# Patient Record
Sex: MALE | Race: WHITE | NOT HISPANIC OR LATINO | Employment: FULL TIME | URBAN - METROPOLITAN AREA
[De-identification: names, ages, dates, MRNs, and addresses within clinical notes are randomized per-mention and may not be internally consistent; named-entity substitution may affect disease eponyms.]

---

## 2018-03-14 ENCOUNTER — NURSE TRIAGE (OUTPATIENT)
Dept: NURSING | Facility: CLINIC | Age: 33
End: 2018-03-14

## 2018-03-14 NOTE — TELEPHONE ENCOUNTER
Drake is having sleep issues that has been off for one year.  Eduardo is waking up eight night around 2 am and feels it also may be due to anxiety.

## 2018-03-14 NOTE — TELEPHONE ENCOUNTER
Reason for Disposition    Symptoms interfere with work or school    Additional Information    Negative: [1] Difficulty breathing AND [2] persists > 10 minutes AND [3] not relieved by reassurance provided by triager    Negative: [1] Lightheadedness or dizziness AND [2] persists > 10 minutes AND [3] not relieved by reassurance provided by triager    Negative: Taking medication containing theophylline (e.g., Theodur)    Negative: [1] Known or suspected alcohol or drug abuse AND [2] feeling very shaky (i.e., visible tremors of hands)    Negative: Patient sounds very sick or weak to the triager    Protocols used: ANXIETY AND PANIC ATTACK-ADULT-

## 2018-03-15 ENCOUNTER — OFFICE VISIT (OUTPATIENT)
Dept: PEDIATRICS | Facility: CLINIC | Age: 33
End: 2018-03-15
Payer: COMMERCIAL

## 2018-03-15 VITALS
DIASTOLIC BLOOD PRESSURE: 70 MMHG | WEIGHT: 159.8 LBS | HEIGHT: 71 IN | SYSTOLIC BLOOD PRESSURE: 106 MMHG | HEART RATE: 70 BPM | TEMPERATURE: 97.4 F | OXYGEN SATURATION: 97 % | BODY MASS INDEX: 22.37 KG/M2

## 2018-03-15 DIAGNOSIS — F41.9 ANXIETY: ICD-10-CM

## 2018-03-15 DIAGNOSIS — F51.02 ADJUSTMENT INSOMNIA: Primary | ICD-10-CM

## 2018-03-15 DIAGNOSIS — R07.9 ACUTE CHEST PAIN: ICD-10-CM

## 2018-03-15 PROCEDURE — 99214 OFFICE O/P EST MOD 30 MIN: CPT | Mod: GC | Performed by: STUDENT IN AN ORGANIZED HEALTH CARE EDUCATION/TRAINING PROGRAM

## 2018-03-15 RX ORDER — TRAZODONE HYDROCHLORIDE 50 MG/1
50 TABLET, FILM COATED ORAL
Qty: 30 TABLET | Refills: 0 | Status: SHIPPED | OUTPATIENT
Start: 2018-03-15 | End: 2018-09-07

## 2018-03-15 ASSESSMENT — PATIENT HEALTH QUESTIONNAIRE - PHQ9: 5. POOR APPETITE OR OVEREATING: SEVERAL DAYS

## 2018-03-15 ASSESSMENT — ANXIETY QUESTIONNAIRES
IF YOU CHECKED OFF ANY PROBLEMS ON THIS QUESTIONNAIRE, HOW DIFFICULT HAVE THESE PROBLEMS MADE IT FOR YOU TO DO YOUR WORK, TAKE CARE OF THINGS AT HOME, OR GET ALONG WITH OTHER PEOPLE: VERY DIFFICULT
1. FEELING NERVOUS, ANXIOUS, OR ON EDGE: SEVERAL DAYS
6. BECOMING EASILY ANNOYED OR IRRITABLE: SEVERAL DAYS
7. FEELING AFRAID AS IF SOMETHING AWFUL MIGHT HAPPEN: SEVERAL DAYS
GAD7 TOTAL SCORE: 6
5. BEING SO RESTLESS THAT IT IS HARD TO SIT STILL: NOT AT ALL
3. WORRYING TOO MUCH ABOUT DIFFERENT THINGS: SEVERAL DAYS
2. NOT BEING ABLE TO STOP OR CONTROL WORRYING: SEVERAL DAYS

## 2018-03-15 NOTE — PATIENT INSTRUCTIONS
"Thank you for coming to clinic today. It was a pleasure to see you and I would be happy to see you back at any time for follow up or for new health issues.    1. Sleep issues, early morning waking:  Likely related to recently stopping overnight shifts. Other things to consider are whether or not your mood symptoms are affecting your sleep.  Recommendations for good sleep hygiene:   - Go to bed and get up at the same time every day  - Have coffee, tea, and other foods that have caffeine only in the morning  - Avoid alcohol in the late afternoon, evening, and bedtime  - Keep your bedroom dark, cool, quiet, and free of reminders of work or other things that cause you stress  - Solve problems you have before you go to bed  - Exercise several days a week, but not right before bed  - Avoid looking at phones or reading devices (\"e-books\") that give off light before bed. This can make it harder to fall asleep.  - Over the counter melatonin every night 20-30 minutes before you go to bed  - Trazodone as needed at bedtime. Recommend trying the melatonin first and if over the next few nights sleep is not improving, try taking one trazodone before sleep.    2. Stress echocardiogram: '  - Ordered to be done at [facility]. Will follow up results with you. If anything abnormal, we will refer you to your cardiologist. Call your insurance provider to see if it will be covered, but likely this will go towards your deductible.    3. Come back in 2-4 weeks for follow up. We will repeat your LION and PHQ score testing and see if you might benefit from some counseling or medication for your mood.    Please let me know how else I can help!    Betsy Figueroa MD  "

## 2018-03-15 NOTE — PROGRESS NOTES
SUBJECTIVE:   Drake Khalil is a 32 year old male who presents to clinic today for the following health issues:      Insomnia    Onset: 1 week    Description:   Time to fall asleep (sleep latency): 30 minutes  Middle of night awakening:  YES- 2 am  Early morning awakening:  YES    Progression of Symptoms:  same and constant    Accompanying Signs & Symptoms:  Daytime sleepiness/napping: no  Excessive snoring/apnea: no  Restless legs: possibly  Frequent urination: no  Chronic pain:  No, but has achiness in R side of chest    History:  Prior Insomnia: no    Precipitating factors:   New stressful situation: YES- filled [employer] last year, has 3 children, quit job 1 week ago  Caffeine intake: every other day has some soda, used to drink coffee 1 cup daily but not in last week  OTC decongestants: no  Any new medications: no    Alleviating factors:  Self medicating (alcohol, etc.):  no    Therapies Tried and outcome: melatonin, zzquil - but not in the last week    1. 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 He went to bed at 8AM and woke up at 1-2PM on weekdays; on Fridays, he went to bed around 8-9PM and was able to sleep through the night and wake up around 7AM. He left his job one week ago due to difficulty managing this sleep schedule and reports that since one week ago, he has had difficulty with sleep.  He goes to bed at 10 PM, has no difficulty falling asleep, but he wakes up spontaneously around 2AM and is unable to fall back asleep.  3-year-old son gets into bed with him around 5AM. He denies worries, feelings of anxiety, or palpitations that prevent him from falling asleep and do not wake him up in the morning.  He denies snoring or episodes of apnea.  He feels rested in the morning despite a few hours of sleep.  He previously took melatonin when he was working night shifts, but has not taken any melatonin the last week.  He drinks soda every other  "day, no coffee, no smoking, rare alcohol use.  He exercises several times a week.  To improve his sleep hygiene, he has decreased his screen time before bed and reads before going to sleep.    2. Anxiety  He was seen in the emergency room in 2015 and again in urgent care in 2017 for palpitations and feelings of anxiety.  His LION 7 score today is 6, rating his symptoms as making it \"very difficult\" for him to live daily life because of his poor sleep. His PHQ-9 score is 7. Triggers for his anxiety include looking for a new job, taking care of his 3 children, and he worries that his anxiety or chest pain may be related to a cardiac problem. He was prescribed Xanax from one of his  visits but he did not fill this prescription and has not had panic attacks.    3. Chest pain   Patient complains of intermittent achy chest pain in the left side of his chest.  It is not worse with exertion, deep breathing, or coughing.  He denies any palpitations.  He is concerned that this may be a cardiac issue. He saw cardiology in September 2017.  Cardiology remarked that he had incomplete right bundle branch on his EKG and that his intermittent episodes of palpitation may be due to SVT, and recommended exercise stress echo.  Patient has not done this yet.  He does not have any anginal symptoms.  He has not had any palpitations since he last saw cardiology in September.      Problem list and histories reviewed & adjusted, as indicated.  Additional history: as documented    There is no problem list on file for this patient.    History reviewed. No pertinent surgical history.    Social History   Substance Use Topics     Smoking status: Never Smoker     Smokeless tobacco: Never Used     Alcohol use Yes      Comment: socially     History reviewed. No pertinent family history.    No Known Allergies  BP Readings from Last 3 Encounters:   03/15/18 106/70   04/06/15 110/71    Wt Readings from Last 3 Encounters:   03/15/18 159 lb 12.8 oz (72.5 " "kg)   04/05/15 165 lb (74.8 kg)                 ROS:  CONSTITUTIONAL: NEGATIVE for fever, chills, change in weight  INTEGUMENTARY/SKIN: NEGATIVE for worrisome rashes, moles or lesions  EYES: NEGATIVE for vision changes or irritation  ENT/MOUTH: NEGATIVE for ear, mouth and throat problems  RESP: NEGATIVE for significant cough or SOB  CV: POSITIVE for chest pain, NEGATIVE for palpitations or peripheral edema  GI: NEGATIVE for nausea, abdominal pain, heartburn, or change in bowel habits  : NEGATIVE for frequency, dysuria, or hematuria  MUSCULOSKELETAL: NEGATIVE for significant arthralgias or myalgia  NEURO: NEGATIVE for weakness, dizziness or paresthesias  ENDOCRINE: NEGATIVE for temperature intolerance, skin/hair changes  HEME: NEGATIVE for bleeding problems  PSYCHIATRIC: POSITIVE for anxiety    OBJECTIVE:     /70 (Cuff Size: Adult Regular)  Pulse 70  Temp 97.4  F (36.3  C) (Oral)  Ht 5' 11\" (1.803 m)  Wt 159 lb 12.8 oz (72.5 kg)  SpO2 97%  BMI 22.29 kg/m2  Body mass index is 22.29 kg/(m^2).    Physical Exam  General: awake, alert, in no acute distress  HEENT: NCAT, PERRL, sclera anicteric, MMM, posterior pharynx without erythema or exudates, no cervical lymphadenopathy  CV: RRR, no murmurs noted, peripheral pulses strong  Resp: CTAB, no increased WOB  Abd: Soft, nontender, nondistended, +BS, no rebound or guarding  MSK: No peripheral edema, extremities warm and well perfused, normal pulses  Skin: warm, dry, no jaundice  Neuro: CN II-XII grossly intact. No focal deficits. Alert and oriented x4.    Diagnostic Test Results:  none     ASSESSMENT/PLAN:   1. Adjustment insomnia  Likely due to adjustment from switching over his schedule from work. Will work on better sleep hygiene and made recommendations for melatonin and trazodone as needed. Expect improvement over the next 1-2 weeks if this is the case, as he adjusts to new sleep schedule. Early morning awakening can also be symptoms of depression and " "anxiety; PHQ-9 is 7 and LION-7 is 6. One of his stressors is his poor sleep. Expect that as sleep improves, he may also have improvement in his depression and anxiety symptoms.  - Recommendations for sleep hygiene:  - Go to bed and get up at the same time every day  - Have coffee, tea, and other foods that have caffeine only in the morning  - Avoid alcohol in the late afternoon, evening, and bedtime  - Keep your bedroom dark, cool, quiet, and free of reminders of work or other things that cause you stress  - Solve problems you have before you go to bed  - Exercise several days a week, but not right before bed  - Avoid looking at phones or reading devices (\"e-books\") that give off light before bed. This can make it harder to fall asleep.  - Over the counter melatonin every night 20-30 minutes before you go to bed  - traZODone (DESYREL) 50 MG tablet; Take 1 tablet (50 mg) by mouth nightly as needed for sleep  Dispense: 30 tablet; Refill: 0    2. Acute chest pain  Dull achy pain in L side of chest. EKG previously showed incomplete RBBB. Currently not concerning for ACS. Cardiology recommended stress echocardiogram in Sept 2017 which has not been done. Recommend doing this, especially with hx of intermittent palpitations and RBBB to r/o structural issues. If anything abnormal, would recommend follow up with Cardiology.  - Exercise Stress Echocardiogram; Future    3. Anxiety  May be related to current stressors of looking for job, switching sleep schedules, concerns that his palpitations/chest pain may be cardiac in etiology. Anxiety seems very much to be related to his sleep currently.  - Will manage sleep for now. Return to clinic in 2-4 weeks for evaluation and to repeat LION and PHQ. If anxiety persists, will consider counseling and medications.      FUTURE APPOINTMENTS:       - Follow-up visit in 2-4 weeks.    Patient was seen and discussed with attending, Dr. Rafael Beebe, who agrees with the above assessment and " plan.    Betsy Figueroa MD  PGY - 2   Internal Medicine/Pediatrics  Pager [phone]  [facility]    I have seen and discussed this patient with Dr. Figueroa and agree with joint documentation as noted above.    Rafael Beebe MD  Attending Internal Medicine/Pediatrics Physician

## 2018-03-15 NOTE — MR AVS SNAPSHOT
"              After Visit Summary   3/15/2018    Drake Khalil    MRN: [id number]           Patient Information     Date Of Birth          1985        Visit Information        Provider Department      3/15/2018 10:30 AM Ivette Figueroa MD [facility] [place]        Today's Diagnoses     Acute chest pain    -  1    Adjustment insomnia          Care Instructions    Thank you for coming to clinic today. It was a pleasure to see you and I would be happy to see you back at any time for follow up or for new health issues.    1. Sleep issues, early morning waking:  Likely related to recently stopping overnight shifts. Other things to consider are whether or not your mood symptoms are affecting your sleep.  Recommendations for good sleep hygiene:   - Go to bed and get up at the same time every day  - Have coffee, tea, and other foods that have caffeine only in the morning  - Avoid alcohol in the late afternoon, evening, and bedtime  - Keep your bedroom dark, cool, quiet, and free of reminders of work or other things that cause you stress  - Solve problems you have before you go to bed  - Exercise several days a week, but not right before bed  - Avoid looking at phones or reading devices (\"e-books\") that give off light before bed. This can make it harder to fall asleep.  - Over the counter melatonin every night 20-30 minutes before you go to bed  - Trazodone as needed at bedtime. Recommend trying the melatonin first and if over the next few nights sleep is not improving, try taking one trazodone before sleep.    2. Stress echocardiogram: '  - Ordered to be done at [facility]. Will follow up results with you. If anything abnormal, we will refer you to your cardiologist. Call your insurance provider to see if it will be covered, but likely this will go towards your deductible.    3. Come back in 2-4 weeks for follow up. We will repeat your LION and PHQ score testing and see if you might benefit from some counseling or " "medication for your mood.    Please let me know how else I can help!    Betsy Figueroa MD          Follow-ups after your visit        Future tests that were ordered for you today     Open Future Orders        Priority Expected Expires Ordered    Exercise Stress Echocardiogram Routine  3/15/2019 3/15/2018            Who to contact     If you have questions or need follow up information about today's clinic visit or your schedule please contact [facility] directly at [phone].  Normal or non-critical lab and imaging results will be communicated to you by [employer], letter or phone within 4 business days after the clinic has received the results. If you do not hear from us within 7 days, please contact the clinic through [employer] or phone. If you have a critical or abnormal lab result, we will notify you by phone as soon as possible.  Submit refill requests through [employer] or call your pharmacy and they will forward the refill request to us. Please allow 3 business days for your refill to be completed.          Additional Information About Your Visit        [employer] Information     [employer] lets you send messages to your doctor, view your test results, renew your prescriptions, schedule appointments and more. To sign up, go to [email or web address] . Click on \"Log in\" on the left side of the screen, which will take you to the Welcome page. Then click on \"Sign up Now\" on the right side of the page.     You will be asked to enter the access code listed below, as well as some personal information. Please follow the directions to create your username and password.     Your access code is: [email or web address]  Expires: 2018 11:31 AM     Your access code will  in 90 days. If you need help or a new code, please call your [facility] clinic or [phone].        Care EveryWhere ID     This is your Care EveryWhere ID. This could be used by other organizations to access your [facility] medical records  [id number]   " "     Your Vitals Were     Pulse Temperature Height Pulse Oximetry BMI (Body Mass Index)       70 97.4  F (36.3  C) (Oral) 5' 11\" (1.803 m) 97% 22.29 kg/m2        Blood Pressure from Last 3 Encounters:   03/15/18 106/70   04/06/15 110/71    Weight from Last 3 Encounters:   03/15/18 159 lb 12.8 oz (72.5 kg)   04/05/15 165 lb (74.8 kg)                 Today's Medication Changes          These changes are accurate as of 3/15/18 11:31 AM.  If you have any questions, ask your nurse or doctor.               Start taking these medicines.        Dose/Directions    traZODone 50 MG tablet   Commonly known as:  DESYREL   Used for:  Adjustment insomnia   Started by:  Ivette Figueroa MD        Dose:  50 mg   Take 1 tablet (50 mg) by mouth nightly as needed for sleep   Quantity:  30 tablet   Refills:  0            Where to get your medicines      These medications were sent to [facility] [place] - [facility] - [address]   [address]  [address], [place] [address]     Phone:  [phone]     traZODone 50 MG tablet                Primary Care Provider Fax #    Physician No Ref-Primary [phone]       No address on file        Equal Access to Services     APRIL MEEK AH: Hadii judi churcho Soomaali, waaxda luqadaha, qaybta kaalmada adeegyada, waxay caroline reyna. So [facility] [phone].    ATENCIÓN: Si habla español, tiene a hills disposición servicios gratuitos de asistencia lingüística. Llregan al [phone].    We comply with applicable federal civil rights laws and [place] laws. We do not discriminate on the basis of race, color, national origin, age, disability, sex, sexual orientation, or gender identity.            Thank you!     Thank you for choosing [facility]  for your care. Our goal is always to provide you with excellent care. Hearing back from our patients is one way we can continue to improve our services. Please take a few minutes to complete the " written survey that you may receive in the mail after your visit with us. Thank you!             Your Updated Medication List - Protect others around you: Learn how to safely use, store and throw away your medicines at www.disposemymeds.org.          This list is accurate as of 3/15/18 11:31 AM.  Always use your most recent med list.                   Brand Name Dispense Instructions for use Diagnosis    traZODone 50 MG tablet    DESYREL    30 tablet    Take 1 tablet (50 mg) by mouth nightly as needed for sleep    Adjustment insomnia

## 2018-03-16 ASSESSMENT — PATIENT HEALTH QUESTIONNAIRE - PHQ9: SUM OF ALL RESPONSES TO PHQ QUESTIONS 1-9: 7

## 2018-03-16 ASSESSMENT — ANXIETY QUESTIONNAIRES: GAD7 TOTAL SCORE: 6

## 2018-03-23 ENCOUNTER — HOSPITAL ENCOUNTER (OUTPATIENT)
Dept: CARDIOLOGY | Facility: CLINIC | Age: 33
Discharge: HOME | End: 2018-03-23
Attending: INTERNAL MEDICINE | Admitting: INTERNAL MEDICINE
Payer: COMMERCIAL

## 2018-03-23 DIAGNOSIS — R07.9 ACUTE CHEST PAIN: ICD-10-CM

## 2018-03-23 PROCEDURE — 93350 STRESS TTE ONLY: CPT | Mod: 26 | Performed by: INTERNAL MEDICINE

## 2018-03-23 PROCEDURE — 93325 DOPPLER ECHO COLOR FLOW MAPG: CPT | Mod: 26 | Performed by: INTERNAL MEDICINE

## 2018-03-23 PROCEDURE — 93016 CV STRESS TEST SUPVJ ONLY: CPT | Performed by: INTERNAL MEDICINE

## 2018-03-23 PROCEDURE — 93321 DOPPLER ECHO F-UP/LMTD STD: CPT | Mod: 26 | Performed by: INTERNAL MEDICINE

## 2018-03-23 PROCEDURE — 93018 CV STRESS TEST I&R ONLY: CPT | Performed by: INTERNAL MEDICINE

## 2018-03-23 PROCEDURE — 93350 STRESS TTE ONLY: CPT | Mod: TC

## 2018-03-29 ENCOUNTER — OFFICE VISIT (OUTPATIENT)
Dept: PEDIATRICS | Facility: CLINIC | Age: 33
End: 2018-03-29
Payer: COMMERCIAL

## 2018-03-29 VITALS
WEIGHT: 161.7 LBS | SYSTOLIC BLOOD PRESSURE: 100 MMHG | OXYGEN SATURATION: 97 % | HEART RATE: 64 BPM | DIASTOLIC BLOOD PRESSURE: 70 MMHG | BODY MASS INDEX: 22.64 KG/M2 | HEIGHT: 71 IN | TEMPERATURE: 98.1 F

## 2018-03-29 DIAGNOSIS — F51.02 ADJUSTMENT INSOMNIA: Primary | ICD-10-CM

## 2018-03-29 PROCEDURE — 99214 OFFICE O/P EST MOD 30 MIN: CPT | Mod: GC | Performed by: STUDENT IN AN ORGANIZED HEALTH CARE EDUCATION/TRAINING PROGRAM

## 2018-03-29 ASSESSMENT — ANXIETY QUESTIONNAIRES
3. WORRYING TOO MUCH ABOUT DIFFERENT THINGS: NOT AT ALL
IF YOU CHECKED OFF ANY PROBLEMS ON THIS QUESTIONNAIRE, HOW DIFFICULT HAVE THESE PROBLEMS MADE IT FOR YOU TO DO YOUR WORK, TAKE CARE OF THINGS AT HOME, OR GET ALONG WITH OTHER PEOPLE: NOT DIFFICULT AT ALL
5. BEING SO RESTLESS THAT IT IS HARD TO SIT STILL: NOT AT ALL
7. FEELING AFRAID AS IF SOMETHING AWFUL MIGHT HAPPEN: NOT AT ALL
6. BECOMING EASILY ANNOYED OR IRRITABLE: NOT AT ALL
1. FEELING NERVOUS, ANXIOUS, OR ON EDGE: NOT AT ALL
4. TROUBLE RELAXING: NOT AT ALL
2. NOT BEING ABLE TO STOP OR CONTROL WORRYING: NOT AT ALL
GAD7 TOTAL SCORE: 0

## 2018-03-29 NOTE — PATIENT INSTRUCTIONS
Thank you for coming to clinic today. It was a pleasure to see you and I would be happy to see you back at any time for follow up or for new health issues.    1. So glad your sleep is better! Recommend using trazodone only as needed, since your sleep really has improved. Continue the melatonin if you feel like you need it, but eventually you may try to stop taking this as well. Continue your good sleep hygiene practices - no screen time right before bed, relaxing before bed, not doing work in the bedroom, exercise during the day, avoiding alcohol/caffiene prior to bedtime, etc.    2. As discussed, your echocardiogram and your EKG to look at your heart were fine.    Please let me know how else I can help! We would want to see you back for your annual physical exam in 1-2 years.    Betsy Figueroa MD

## 2018-03-29 NOTE — PROGRESS NOTES
SUBJECTIVE:   Drake Khalil is a 32 year old male who presents to clinic today for the following health issues:      Insomnia Follow up       Duration: follow up from 3/15/18 visit, Pt reported symptoms improved since last OV and starting trazodone.    Description  Frequency of insomnia:  1-2 times a week in the week after first visit; improved since  Time to fall asleep: 30 minutes  Middle of night awakenin-2 times a week after first visit  Early morning awakenin-2 times a week after first visit    Accompanying signs and symptoms:  None new since last visit     History  Similar episodes in past:  no   Previous evaluation/sleep study:  no     Precipitating or alleviating factors:  New stressful situation: no   Caffeine intake after lunchtime: no   OTC decongestants: no   Any new medications: no     Therapies tried and outcome: trazodone     1. Sleep  Patient presents for follow-up for sleep difficulties.  He was last seen March 15.  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  He quit because of the difficult sleep schedule.  The week after he left his job, he has difficulty staying asleep.  He will go to bed at 10 PM and wake up spontaneously around 2 AM.  He also scored a 7 on his LION and 7 on his PHQ, most of the symptoms related to his sleep, but also raised the question of whether anxiety or depression could be contributing to early morning wake gain.  I counseled him on improved sleep hygiene, recommended nightly melatonin, and trazodone as needed.  Patient reports that the first 2 nights after our clinic visit, he tried melatonin nightly with no improvement in his sleep.  On the third night he used trazodone with improvement.  The first week, he had 1-2 nights where he had early morning waking at 2 AM and was unable to fall back asleep.  However in the weeks since, he has been able to go to bed around 10 to 10:30 PM and  "wake up spontaneously around 5:30 in the morning, and feels rested during the day.  Last night he did not use trazodone, and still slept well.  His LION and PHQ scores were 0 today.  He is wondering if he should stop taking the trazodone.    2. Anxiety/chest pain  He was seen in the emergency room in 2015 and again in urgent care in 2017 for palpitations and feelings of anxiety. He worried that his anxiety or chest pain may be related to a cardiac problem. He intermittently has dull L sided chest \"achiness\" or a twinge. EKG at last visit negative except for known RBBB. Stress echo was done (had been recommended by Cardiology in Sept 2017 but patient did not do it) after the visit that showed normal EF, wall motion, complete RBBB, and he had no chest pain during the test.  Some of his stressors included looking for a new job and taking care of his children. He now has a part time job at [employer] and is doing well with this.       Problem list and histories reviewed & adjusted, as indicated.  Additional history: as documented    There is no problem list on file for this patient.    No past surgical history on file.    Social History   Substance Use Topics     Smoking status: Never Smoker     Smokeless tobacco: Never Used     Alcohol use Yes      Comment: socially     No family history on file.      Current Outpatient Prescriptions   Medication Sig Dispense Refill     traZODone (DESYREL) 50 MG tablet Take 1 tablet (50 mg) by mouth nightly as needed for sleep 30 tablet 0     No Known Allergies  BP Readings from Last 3 Encounters:   03/29/18 100/70   03/15/18 106/70   04/06/15 110/71    Wt Readings from Last 3 Encounters:   03/29/18 161 lb 11.2 oz (73.3 kg)   03/15/18 159 lb 12.8 oz (72.5 kg)   04/05/15 165 lb (74.8 kg)                    ROS:  CONSTITUTIONAL: NEGATIVE for fever, chills, change in weight  INTEGUMENTARY/SKIN: NEGATIVE for worrisome rashes, moles or lesions  EYES: NEGATIVE for vision changes or " "irritation  ENT/MOUTH: NEGATIVE for ear, mouth and throat problems  RESP: NEGATIVE for significant cough or SOB  CV: NEGATIVE for chest pain, palpitations or peripheral edema  GI: NEGATIVE for nausea, abdominal pain, heartburn, or change in bowel habits  : NEGATIVE for frequency, dysuria, or hematuria  MUSCULOSKELETAL: NEGATIVE for significant arthralgias or myalgia  NEURO: NEGATIVE for weakness, dizziness or paresthesias  ENDOCRINE: NEGATIVE for temperature intolerance, skin/hair changes  HEME: NEGATIVE for bleeding problems  PSYCHIATRIC: NEGATIVE for changes in mood or affect    OBJECTIVE:     /70  Pulse 64  Temp 98.1  F (36.7  C) (Oral)  Ht 5' 11\" (1.803 m)  Wt 161 lb 11.2 oz (73.3 kg)  SpO2 97%  BMI 22.55 kg/m2  Body mass index is 22.55 kg/(m^2).  Physical Exam    General: awake, alert, in no acute distress  HEENT: NCAT, PERRL, EOMI, sclera anicteric, no nasal discharge, MMM, posterior pharynx without erythema or exudates, no cervical lymphadenopathy  CV: RRR, no murmurs noted, peripheral pulses strong  Resp: CTAB, no increased WOB  Abd: Soft, nontender, nondistended, +BS, no rebound or guarding  MSK: No peripheral edema, extremities warm and well perfused, normal pulses  Skin: warm, dry, no jaundice  Neuro: CN II-XII grossly intact. No focal deficits. Alert and oriented x4.      Diagnostic Test Results:  none     ASSESSMENT/PLAN:       1. Adjustment insomnia  Resolved, as his body adjusted to his new sleep schedule and with the use of melatonin and trazodone.  Recommended using trazodone as needed and eventually coming off it entirely.  Can continue to take melatonin nightly if he finds this helpful.  If not he can also stop this.  His LION and PHQ scores today are 0, suggesting that his previous concerns for anxiety or depression related to his sleep issues.  Reassured that his cardiac workup was negative.  -Trazodone as needed, plan to eventually stop this over the next week or 2  -Melatonin " nightly as needed      FUTURE APPOINTMENTS:       - Follow-up for annual visit or as needed    Patient was seen and discussed with attending, Dr. Fady Olivera, who agrees with the above assessment and plan.    Betsy Figueroa MD  PGY - 2   Internal Medicine/Pediatrics  Pager [phone]  [facility]    I have seen this patient and examined him in the presence of Dr. Figueroa.  I was present during the key components of the presenting complaints, physical exam, diagnosis, and plan, and fully concur with the plan as listed in the resident's note.

## 2018-03-29 NOTE — MR AVS SNAPSHOT
After Visit Summary   3/29/2018    Drake Khalil    MRN: [id number]           Patient Information     Date Of Birth          1985        Visit Information        Provider Department      3/29/2018 4:00 PM Ivette Figueroa MD [facility]        Care Instructions    Thank you for coming to clinic today. It was a pleasure to see you and I would be happy to see you back at any time for follow up or for new health issues.    1. So glad your sleep is better! Recommend using trazodone only as needed, since your sleep really has improved. Continue the melatonin if you feel like you need it, but eventually you may try to stop taking this as well. Continue your good sleep hygiene practices - no screen time right before bed, relaxing before bed, not doing work in the bedroom, exercise during the day, avoiding alcohol/caffiene prior to bedtime, etc.    2. As discussed, your echocardiogram and your EKG to look at your heart were fine.    Please let me know how else I can help! We would want to see you back for your annual physical exam in 1-2 years.    Betsy Figueroa MD          Follow-ups after your visit        Follow-up notes from your care team     Return if symptoms worsen or fail to improve.      Who to contact     If you have questions or need follow up information about today's clinic visit or your schedule please contact [facility] directly at [phone].  Normal or non-critical lab and imaging results will be communicated to you by MyChart, letter or phone within 4 business days after the clinic has received the results. If you do not hear from us within 7 days, please contact the clinic through MyChart or phone. If you have a critical or abnormal lab result, we will notify you by phone as soon as possible.  Submit refill requests through [employer] or call your pharmacy and they will forward the refill request to us. Please allow 3 business days for your refill to be completed.  "         Additional Information About Your Visit        MyChart Information     [employer] lets you send messages to your doctor, view your test results, renew your prescriptions, schedule appointments and more. To sign up, go to [email or web address] . Click on \"Log in\" on the left side of the screen, which will take you to the Welcome page. Then click on \"Sign up Now\" on the right side of the page.     You will be asked to enter the access code listed below, as well as some personal information. Please follow the directions to create your username and password.     Your access code is: [email or web address]  Expires: 2018 11:31 AM     Your access code will  in 90 days. If you need help or a new code, please call your [facility] clinic or [phone].        Care EveryWhere ID     This is your Care EveryWhere ID. This could be used by other organizations to access your [facility] medical records  [id number]        Your Vitals Were     Pulse Temperature Height Pulse Oximetry BMI (Body Mass Index)       64 98.1  F (36.7  C) (Oral) 5' 11\" (1.803 m) 97% 22.55 kg/m2        Blood Pressure from Last 3 Encounters:   18 100/70   03/15/18 106/70   04/06/15 110/71    Weight from Last 3 Encounters:   18 161 lb 11.2 oz (73.3 kg)   03/15/18 159 lb 12.8 oz (72.5 kg)   04/05/15 165 lb (74.8 kg)              Today, you had the following     No orders found for display       Primary Care Provider Office Phone # Fax #    Armandog Terri Figueroa [phone]       [facility] [address]  [facility] [address]        Equal Access to Services     APRIL MEEK AH: Judi Dickey, anand pham, qavianca kaalmausama bolanos. So [facility] [phone].    ATENCIÓN: Si habla español, tiene a hills disposición servicios gratuitos de asistencia lingüística. Llame al [phone].    We comply with applicable federal civil rights laws and [place] laws. We do not " discriminate on the basis of race, color, national origin, age, disability, sex, sexual orientation, or gender identity.            Thank you!     Thank you for choosing [facility]  for your care. Our goal is always to provide you with excellent care. Hearing back from our patients is one way we can continue to improve our services. Please take a few minutes to complete the written survey that you may receive in the mail after your visit with us. Thank you!             Your Updated Medication List - Protect others around you: Learn how to safely use, store and throw away your medicines at www.disposemymeds.org.          This list is accurate as of 3/29/18  4:51 PM.  Always use your most recent med list.                   Brand Name Dispense Instructions for use Diagnosis    traZODone 50 MG tablet    DESYREL    30 tablet    Take 1 tablet (50 mg) by mouth nightly as needed for sleep    Adjustment insomnia

## 2018-03-30 ASSESSMENT — ANXIETY QUESTIONNAIRES: GAD7 TOTAL SCORE: 0

## 2018-03-30 ASSESSMENT — PATIENT HEALTH QUESTIONNAIRE - PHQ9: SUM OF ALL RESPONSES TO PHQ QUESTIONS 1-9: 0

## 2018-05-21 PROBLEM — F41.9 ANXIETY: Status: ACTIVE | Noted: 2018-05-21

## 2018-05-21 PROBLEM — F41.9 ANXIETY: Status: ACTIVE | Noted: 2018-03-15

## 2018-09-07 DIAGNOSIS — F51.02 ADJUSTMENT INSOMNIA: ICD-10-CM

## 2018-09-07 RX ORDER — TRAZODONE HYDROCHLORIDE 50 MG/1
TABLET, FILM COATED ORAL
Qty: 30 TABLET | Refills: 1 | Status: SHIPPED | OUTPATIENT
Start: 2018-09-07 | End: 2022-12-08

## 2018-09-07 NOTE — TELEPHONE ENCOUNTER
Prescription approved per [facility] Refill Protocol.  Sonali Bright RN

## 2018-09-07 NOTE — TELEPHONE ENCOUNTER
"Requested Prescriptions   Pending Prescriptions Disp Refills     traZODone (DESYREL) 50 MG tablet [Pharmacy Med Name: TRAZODONE HCL 50MG TABS]    Last Written Prescription Date:  3/15/2018  Last Fill Quantity: 30,  # refills: 0   Last office visit: 3/29/2018 with prescribing provider:  Ivette Figueroa     Future Office Visit:     30 tablet 0     Sig: TAKE ONE TABLET BY MOUTH EVERY NIGHT AT BEDTIME AS NEEDED FOR SLEEP    Serotonin Modulators Passed    9/7/2018 11:05 AM       Passed - Recent (12 mo) or future (30 days) visit within the authorizing provider's specialty    Patient had office visit in the last 12 months or has a visit in the next 30 days with authorizing provider or within the authorizing provider's specialty.  See \"Patient Info\" tab in inbasket, or \"Choose Columns\" in Meds & Orders section of the refill encounter.           Passed - Patient is age 18 or older          "

## 2022-02-02 ENCOUNTER — TRANSFERRED RECORDS (OUTPATIENT)
Dept: HEALTH INFORMATION MANAGEMENT | Facility: CLINIC | Age: 37
End: 2022-02-02
Payer: COMMERCIAL

## 2022-11-08 ENCOUNTER — TRANSFERRED RECORDS (OUTPATIENT)
Dept: PEDIATRICS | Facility: CLINIC | Age: 37
End: 2022-11-08

## 2022-12-08 ENCOUNTER — TELEPHONE (OUTPATIENT)
Dept: FAMILY MEDICINE | Facility: CLINIC | Age: 37
End: 2022-12-08

## 2022-12-08 ENCOUNTER — OFFICE VISIT (OUTPATIENT)
Dept: FAMILY MEDICINE | Facility: CLINIC | Age: 37
End: 2022-12-08
Payer: COMMERCIAL

## 2022-12-08 VITALS
WEIGHT: 176.3 LBS | RESPIRATION RATE: 20 BRPM | SYSTOLIC BLOOD PRESSURE: 110 MMHG | BODY MASS INDEX: 23.88 KG/M2 | HEIGHT: 72 IN | OXYGEN SATURATION: 99 % | TEMPERATURE: 98.7 F | DIASTOLIC BLOOD PRESSURE: 70 MMHG | HEART RATE: 67 BPM

## 2022-12-08 DIAGNOSIS — Z13.220 SCREENING FOR HYPERLIPIDEMIA: ICD-10-CM

## 2022-12-08 DIAGNOSIS — Z00.00 ROUTINE GENERAL MEDICAL EXAMINATION AT A HEALTH CARE FACILITY: Primary | ICD-10-CM

## 2022-12-08 DIAGNOSIS — Z11.4 SCREENING FOR HIV (HUMAN IMMUNODEFICIENCY VIRUS): ICD-10-CM

## 2022-12-08 DIAGNOSIS — Z11.59 NEED FOR HEPATITIS C SCREENING TEST: ICD-10-CM

## 2022-12-08 PROCEDURE — 99385 PREV VISIT NEW AGE 18-39: CPT | Performed by: PHYSICIAN ASSISTANT

## 2022-12-08 ASSESSMENT — ENCOUNTER SYMPTOMS
DYSURIA: 0
HEARTBURN: 0
PARESTHESIAS: 0
ABDOMINAL PAIN: 0
FREQUENCY: 0
SHORTNESS OF BREATH: 0
HEMATOCHEZIA: 0
WEAKNESS: 0
HEADACHES: 0
DIZZINESS: 0
HEMATURIA: 0
DIARRHEA: 0
SORE THROAT: 0
NAUSEA: 0
FEVER: 0
CHILLS: 0
EYE PAIN: 0
PALPITATIONS: 0
MYALGIAS: 0
COUGH: 0
ARTHRALGIAS: 0
CONSTIPATION: 0
JOINT SWELLING: 0

## 2022-12-08 ASSESSMENT — PAIN SCALES - GENERAL: PAINLEVEL: NO PAIN (0)

## 2022-12-08 NOTE — PROGRESS NOTES
SUBJECTIVE:   CC: Drake is an 37 year old who presents for preventative health visit.     Patient has been advised of split billing requirements and indicates understanding: Yes  Healthy Habits:     Getting at least 3 servings of Calcium per day:  Yes    Bi-annual eye exam:  Yes    Dental care twice a year:  Yes    Sleep apnea or symptoms of sleep apnea:  None    Diet:  Regular (no restrictions)    Frequency of exercise:  1 day/week    Duration of exercise:  Less than 15 minutes    Taking medications regularly:  Yes    Medication side effects:  Not applicable    PHQ-2 Total Score: 0    Additional concerns today:  No    Drake Khalil is a 37 year old male who presents today for annual check up  Overall feels well  No active exercise plan but is active    Today's PHQ-2 Score:   PHQ-2 ( 1999 Pf[employer]) 12/8/2022   Q1: Little interest or pleasure in doing things 0   Q2: Feeling down, depressed or hopeless 0   PHQ-2 Score 0   Q1: Little interest or pleasure in doing things Not at all   Q2: Feeling down, depressed or hopeless Not at all   PHQ-2 Score 0       Have you ever done Advance Care Planning? (For example, a Health Directive, POLST, or a discussion with a medical provider or your loved ones about your wishes): No, advance care planning information given to patient to review.  Patient declined advance care planning discussion at this time.    Social History     Tobacco Use     Smoking status: Never     Smokeless tobacco: Never   Substance Use Topics     Alcohol use: Yes     Comment: socially     If you drink alcohol do you typically have >3 drinks per day or >7 drinks per week? Not applicable    Alcohol Use 12/8/2022   Prescreen: >3 drinks/day or >7 drinks/week? No   No flowsheet data found.    Last PSA: No results found for: PSA    Reviewed orders with patient. Reviewed health maintenance and updated orders accordingly - Yes  Lab work is in process  Labs reviewed in EPIC    Reviewed and updated as needed this visit  "by clinical staff   Tobacco  Allergies  Meds              Reviewed and updated as needed this visit by Provider                     Review of Systems   Constitutional: Negative for chills and fever.   HENT: Negative for congestion, ear pain, hearing loss and sore throat.    Eyes: Negative for pain and visual disturbance.   Respiratory: Negative for cough and shortness of breath.    Cardiovascular: Negative for chest pain, palpitations and peripheral edema.   Gastrointestinal: Negative for abdominal pain, constipation, diarrhea, heartburn, hematochezia and nausea.   Genitourinary: Negative for dysuria, frequency, genital sores, hematuria, impotence, penile discharge and urgency.   Musculoskeletal: Negative for arthralgias, joint swelling and myalgias.   Skin: Negative for rash.   Neurological: Negative for dizziness, weakness, headaches and paresthesias.   Psychiatric/Behavioral: Negative for mood changes.       OBJECTIVE:   /70 (BP Location: Right arm, Patient Position: Sitting, Cuff Size: Adult Regular)   Pulse 67   Temp 98.7  F (37.1  C) (Oral)   Resp 20   Ht 1.836 m (6' 0.3\")   Wt 80 kg (176 lb 4.8 oz)   SpO2 99%   BMI 23.71 kg/m      Physical Exam  GENERAL: healthy, alert and no distress  EYES: Eyes grossly normal to inspection, PERRL and conjunctivae and sclerae normal  HENT: ear canals and TM's normal, nose and mouth without ulcers or lesions  NECK: no adenopathy, no asymmetry, masses, or scars and thyroid normal to palpation  RESP: lungs clear to auscultation - no rales, rhonchi or wheezes  CV: regular rate and rhythm, normal S1 S2, no S3 or S4, no murmur, click or rub, no peripheral edema and peripheral pulses strong  ABDOMEN: soft, nontender, no hepatosplenomegaly, bowel sounds normal; likely start of abd hernia; small nontender  MS: no gross musculoskeletal defects noted, no edema  NEURO: Normal strength and tone, mentation intact and speech normal      Diagnostic Test Results:  Labs " reviewed in Epic    ASSESSMENT/PLAN:   1. Routine general medical examination at a health care facility  Reviewed personal and family history. Reviewed age appropriate screenings. Recommended any needed vaccinations. He'll consider  - Glucose; Future    2. Screening for HIV (human immunodeficiency virus)  Per CDC  - HIV Antigen Antibody Combo; Future    3. Need for hepatitis C screening test  Per CDC  - Hepatitis C Screen Reflex to HCV RNA Quant and Genotype; Future    4. Screening for hyperlipidemia  - Lipid panel reflex to direct LDL Fasting; Future          COUNSELING:   Reviewed preventive health counseling, as reflected in patient instructions        He reports that he has never smoked. He has never used smokeless tobacco.            Drake Llamas PA-C  [facility]

## 2022-12-08 NOTE — PATIENT INSTRUCTIONS
Tetanus vaccine? Covid booster?    Get the fasting blood work done  Preventive Health Recommendations  Male Ages 26 - 39    Yearly exam:             See your health care provider every year in order to  o   Review health changes.   o   Discuss preventive care.    o   Review your medicines if your doctor has prescribed any.    You should be tested each year for STDs (sexually transmitted diseases), if you re at risk.     After age 35, talk to your provider about cholesterol testing. If you are at risk for heart disease, have your cholesterol tested at least every 5 years.     If you are at risk for diabetes, you should have a diabetes test (fasting glucose).  Shots: Get a flu shot each year. Get a tetanus shot every 10 years.     Nutrition:    Eat at least 5 servings of fruits and vegetables daily.     Eat whole-grain bread, whole-wheat pasta and brown rice instead of white grains and rice.     Get adequate Calcium and Vitamin D.     Lifestyle    Exercise for at least 150 minutes a week (30 minutes a day, 5 days a week). This will help you control your weight and prevent disease.     Limit alcohol to one drink per day.     No smoking.     Wear sunscreen to prevent skin cancer.     See your dentist every six months for an exam and cleaning.

## 2022-12-08 NOTE — TELEPHONE ENCOUNTER
Dad side of cancer history    Dad had tumor- Gastro Intestinal Stromao tumor    No history of melanoma        Please add to chart

## 2022-12-26 ENCOUNTER — LAB (OUTPATIENT)
Dept: LAB | Facility: CLINIC | Age: 37
End: 2022-12-26
Payer: COMMERCIAL

## 2022-12-26 DIAGNOSIS — Z11.4 SCREENING FOR HIV (HUMAN IMMUNODEFICIENCY VIRUS): ICD-10-CM

## 2022-12-26 DIAGNOSIS — Z13.220 SCREENING FOR HYPERLIPIDEMIA: ICD-10-CM

## 2022-12-26 DIAGNOSIS — Z11.59 NEED FOR HEPATITIS C SCREENING TEST: ICD-10-CM

## 2022-12-26 DIAGNOSIS — Z00.00 ROUTINE GENERAL MEDICAL EXAMINATION AT A HEALTH CARE FACILITY: ICD-10-CM

## 2022-12-26 LAB
CHOLEST SERPL-MCNC: 191 MG/DL
FASTING STATUS PATIENT QL REPORTED: YES
GLUCOSE SERPL-MCNC: 90 MG/DL (ref 70–99)
HCV AB SERPL QL IA: NONREACTIVE
HDLC SERPL-MCNC: 54 MG/DL
HIV 1+2 AB+HIV1 P24 AG SERPL QL IA: NONREACTIVE
LDLC SERPL CALC-MCNC: 121 MG/DL
NONHDLC SERPL-MCNC: 137 MG/DL
TRIGL SERPL-MCNC: 79 MG/DL

## 2022-12-26 PROCEDURE — 87389 HIV-1 AG W/HIV-1&-2 AB AG IA: CPT

## 2022-12-26 PROCEDURE — 80061 LIPID PANEL: CPT

## 2022-12-26 PROCEDURE — 86803 HEPATITIS C AB TEST: CPT

## 2022-12-26 PROCEDURE — 36415 COLL VENOUS BLD VENIPUNCTURE: CPT

## 2022-12-26 PROCEDURE — 82947 ASSAY GLUCOSE BLOOD QUANT: CPT

## 2022-12-30 ENCOUNTER — MYC MEDICAL ADVICE (OUTPATIENT)
Dept: FAMILY MEDICINE | Facility: CLINIC | Age: 37
End: 2022-12-30

## 2022-12-30 NOTE — TELEPHONE ENCOUNTER
Routed to Eduardo Llamas, please review  message and advise.    Hayley Lisa RN, BSN  [facility]

## 2023-04-01 ENCOUNTER — HEALTH MAINTENANCE LETTER (OUTPATIENT)
Age: 38
End: 2023-04-01

## 2023-06-12 ENCOUNTER — TRANSFERRED RECORDS (OUTPATIENT)
Dept: HEALTH INFORMATION MANAGEMENT | Facility: CLINIC | Age: 38
End: 2023-06-12

## 2024-01-14 ENCOUNTER — HEALTH MAINTENANCE LETTER (OUTPATIENT)
Age: 39
End: 2024-01-14

## 2024-04-15 ENCOUNTER — OFFICE VISIT (OUTPATIENT)
Dept: FAMILY MEDICINE | Facility: CLINIC | Age: 39
End: 2024-04-15
Payer: COMMERCIAL

## 2024-04-15 ENCOUNTER — NURSE TRIAGE (OUTPATIENT)
Dept: FAMILY MEDICINE | Facility: CLINIC | Age: 39
End: 2024-04-15

## 2024-04-15 VITALS
HEART RATE: 68 BPM | BODY MASS INDEX: 25.21 KG/M2 | OXYGEN SATURATION: 96 % | WEIGHT: 186.1 LBS | RESPIRATION RATE: 24 BRPM | HEIGHT: 72 IN | DIASTOLIC BLOOD PRESSURE: 82 MMHG | TEMPERATURE: 97.8 F | SYSTOLIC BLOOD PRESSURE: 136 MMHG

## 2024-04-15 DIAGNOSIS — L29.0 ANAL PRURITUS: Primary | ICD-10-CM

## 2024-04-15 PROCEDURE — 99213 OFFICE O/P EST LOW 20 MIN: CPT | Performed by: PHYSICIAN ASSISTANT

## 2024-04-15 RX ORDER — BENZOCAINE/MENTHOL 6 MG-10 MG
LOZENGE MUCOUS MEMBRANE 2 TIMES DAILY
Qty: 30 G | Refills: 1 | Status: SHIPPED | OUTPATIENT
Start: 2024-04-15 | End: 2024-07-11

## 2024-04-15 ASSESSMENT — PAIN SCALES - GENERAL: PAINLEVEL: NO PAIN (0)

## 2024-04-15 NOTE — PATIENT INSTRUCTIONS
Preparation H (generic)  I am sending a steroid tube (larger) to use at least twice daily  Then cover with a barrier (a&d or desitin)  Keeping dry and away from other irritants is key -- ie sweat, stool, etc

## 2024-04-15 NOTE — PROGRESS NOTES
"  Assessment & Plan     Anal pruritus  No obvious etiology for itching - he did shave recently and I recommended he not shave his anal hair; recommend trial of fiber along with multiple topical medications, assuring anal dryness and close follow up if not improving  - hydrocortisone (CORTAID) 1 % external cream; Apply topically 2 times daily    BMI  Estimated body mass index is 25.04 kg/m  as calculated from the following:    Height as of this encounter: 1.836 m (6' 0.28\").    Weight as of this encounter: 84.4 kg (186 lb 1.6 oz).       Cachorro Juan is a 38 year old, presenting for the following health issues:  rectal itching        4/15/2024     9:54 AM   Additional Questions   Roomed by Lisa ALEXANDER MA   Accompanied by Self         4/15/2024     9:54 AM   Patient Reported Additional Medications   Patient reports taking the following new medications Prednisone (Finished a week ago)     History of Present Illness       Reason for visit:  Anal itching  Symptom onset:  3-4 weeks ago  Symptoms include:  Anal itching, especially at night  Symptom intensity:  Moderate  Symptom progression:  Staying the same  Had these symptoms before:  No  What makes it worse:  Itches more at night  What makes it better:  Cream    He eats 0-1 servings of fruits and vegetables daily.He consumes 1 sweetened beverage(s) daily.He exercises with enough effort to increase his heart rate 10 to 19 minutes per day.  He exercises with enough effort to increase his heart rate 3 or less days per week.   He is taking medications regularly.     Drake Khalil is a 38 year old male who presents today for ongoing anal itching around one month ago  Some on the penis/scrotum as well  Switched out soaps and detergents without improvement  [facility] and started prednisone and possibly improved things   Now just the anal itching remains   -between mild and moderate    -occurring most of the day   -no blood noted   -no pain " "with bowel movements   -itching feels internal more than external          Review of Systems  Constitutional, HEENT, cardiovascular, pulmonary, gi and gu systems are negative, except as otherwise noted.      Objective    /82 (BP Location: Right arm, Patient Position: Sitting, Cuff Size: Adult Large)   Pulse 68   Temp 97.8  F (36.6  C) (Oral)   Resp 24   Ht 1.836 m (6' 0.28\")   Wt 84.4 kg (186 lb 1.6 oz)   SpO2 96%   BMI 25.04 kg/m    Body mass index is 25.04 kg/m .  Physical Exam   GENERAL: alert and no distress   (male): normal male genitalia without lesions or urethral discharge, no hernia  RECTAL (male): there is evidence of excoriation and previous shaving but no other carmen abnormality on exam in area of concern. Evidence of past pilonidal disease            Signed Electronically by: Drake Llamas PA-C    "

## 2024-04-15 NOTE — TELEPHONE ENCOUNTER
Patient calls to report ongoing rectal itching x 1 month. He was seen at [facility] clinic and prescribed anti-itch cream (doesn't remember which cream), has also tried cortisone 10. Symptoms still persist. Scheduled an appointment today to be seen per protocol recommendations:    Future Appointments 4/15/2024 - 10/12/2024        Date Visit Type Length Department Provider     4/15/2024 10:00 AM OFFICE VISIT 30 min RM Drake Chris PA-C    Location Instructions:     [facility] is located at [address] near [address]. This is a half mile west of [address] [place]. If traveling west on [address], turn south onto [facility], then west onto [address] to reach the parking lot. If traveling east on [address], turn south directly onto [facility].              5/14/2024  4:00 PM PREVENTATIVE ADULT 30 min RM Drake Chris PA-C    Location Instructions:     [facility] is located at [address] near [address]. This is a half mile west of [address] [place]. If traveling west on [address], turn south onto [facility], then west onto [address] to reach the parking lot. If traveling east on [address], turn south directly onto [facility].                   Dary Castelan RN on 4/15/2024 at 8:43 AM      Reason for Disposition   Home treatment > 3 days for rectal pain and not improved    Additional Information   Negative: Sounds like a life-threatening emergency to the triager   Negative: Diarrhea is main symptom   Negative: Constipation is main symptom (e.g., pain or discomfort caused by passage of hard BMs)   Negative: Blood in or on bowel movement is main symptom   Negative: MPOX SUSPECTED (e.g., direct skin contact such as sex, recent travel to [place]) and any SYMPTOMS OF MPOX (e.g., rash, fever, muscle aches, or  "swollen lymph nodes)   Negative: At risk for Mpox (men-who-have-sex-with-men) and possible exposure (e.g., multiple sex partners in past 21 days) and ANY SYMPTOMS OF MPOX (e.g., rash, fever, muscle aches, or swollen lymph nodes)   Negative: Sexual assault or rape (sexual intercourse or activity occurs without freely given consent), known or suspected   Negative: Injury to rectum   Negative: Patient sounds very sick or weak to the triager   Negative: Severe rectal pain   Negative: Rectal pain or redness and fever > 100.4 F (38.0 C)   Negative: Acute onset rectal pain and constipation (straining with rectal pressure or fullness), which is not relieved by Sitz bath or suppository   Negative: MODERATE-SEVERE rectal pain (i.e., interferes with school, work, or sleep)   Negative: MODERATE-SEVERE rectal itching (i.e., interferes with school, work, or sleep)   Negative: Last bowel movement (BM) > 4 days ago   Negative: Rectal area looks infected (e.g., draining sore, spreading redness)   Negative: Rash of rectal area (e.g., open sore, painful tiny water blisters, unexplained bumps)   Negative: Patient is worried they have a sexually transmitted infection (STI)    Answer Assessment - Initial Assessment Questions  1. SYMPTOM:  Rectal itching   2. ONSET: \"When did the rectal itching start?\"      1 month ago  3. RECTAL PAIN:  Denies  4. RECTAL ITCHING: Moderate  5. CONSTIPATION: Denies  6. CAUSE: Unsure  7. OTHER SYMPTOMS: Denies    Protocols used: Rectal Symptoms-A-OH    "

## 2024-05-14 ENCOUNTER — OFFICE VISIT (OUTPATIENT)
Dept: FAMILY MEDICINE | Facility: CLINIC | Age: 39
End: 2024-05-14
Payer: COMMERCIAL

## 2024-05-14 VITALS
RESPIRATION RATE: 24 BRPM | HEIGHT: 72 IN | OXYGEN SATURATION: 96 % | SYSTOLIC BLOOD PRESSURE: 114 MMHG | TEMPERATURE: 97.4 F | DIASTOLIC BLOOD PRESSURE: 77 MMHG | HEART RATE: 69 BPM | WEIGHT: 187.4 LBS | BODY MASS INDEX: 25.38 KG/M2

## 2024-05-14 DIAGNOSIS — Z00.00 ROUTINE GENERAL MEDICAL EXAMINATION AT A HEALTH CARE FACILITY: Primary | ICD-10-CM

## 2024-05-14 PROCEDURE — 99395 PREV VISIT EST AGE 18-39: CPT | Performed by: PHYSICIAN ASSISTANT

## 2024-05-14 SDOH — HEALTH STABILITY: PHYSICAL HEALTH: ON AVERAGE, HOW MANY DAYS PER WEEK DO YOU ENGAGE IN MODERATE TO STRENUOUS EXERCISE (LIKE A BRISK WALK)?: 5 DAYS

## 2024-05-14 SDOH — HEALTH STABILITY: PHYSICAL HEALTH: ON AVERAGE, HOW MANY MINUTES DO YOU ENGAGE IN EXERCISE AT THIS LEVEL?: 30 MIN

## 2024-05-14 ASSESSMENT — SOCIAL DETERMINANTS OF HEALTH (SDOH): HOW OFTEN DO YOU GET TOGETHER WITH FRIENDS OR RELATIVES?: ONCE A WEEK

## 2024-05-14 ASSESSMENT — PAIN SCALES - GENERAL: PAINLEVEL: NO PAIN (0)

## 2024-05-14 NOTE — PATIENT INSTRUCTIONS
"Check in on Hepatitis B and Tdap (tetanus) vaccines      Preventive Care Advice   This is general advice we often give to help people stay healthy. Your care team may have specific advice just for you. Please talk to your care team about your own preventive care needs.  Lifestyle  Exercise at least 150 minutes each week (30 minutes a day, 5 days a week).  Do muscle strengthening activities 2 days a week. These help control your weight and prevent disease.  No smoking.  Wear sunscreen to prevent skin cancer.  Have your home tested for radon every 2 to 5 years. Radon is a colorless, odorless gas that can harm your lungs. To learn more, go to www.health.[facility] and search for \"Radon in Homes.\"  Keep guns unloaded and locked up in a safe place like a safe or gun vault, or, use a gun lock and hide the keys. Always lock away bullets separately. To learn more, visit [place].gov and search for \"safe gun storage.\"  Nutrition  Eat 5 or more servings of fruits and vegetables each day.  Try wheat bread, brown rice and whole grain pasta (instead of white bread, rice, and pasta).  Get enough calcium and vitamin D. Check the label on foods and aim for 100% of the RDA (recommended daily allowance).  Regular exams  Have a dental exam and cleaning every 6 months.  See your health care team every year to talk about:  Any changes in your health.  Any medicines your care team has prescribed.  Preventive care, family planning, and ways to prevent chronic diseases.  Shots (vaccines)   HPV shots (up to age 26), if you've never had them before.  Hepatitis B shots (up to age 59), if you've never had them before.  COVID-19 shot: Get this shot when it's due.  Flu shot: Get a flu shot every year.  Tetanus shot: Get a tetanus shot every 10 years.  Pneumococcal, hepatitis A, and RSV shots: Ask your care team if you need these based on your risk.  Shingles shot (for age 50 and up).  General health tests  Diabetes screening:  Starting at age 35, " Get screened for diabetes at least every 3 years.  If you are younger than age 35, ask your care team if you should be screened for diabetes.  Cholesterol test: At age 39, start having a cholesterol test every 5 years, or more often if advised.  Bone density scan (DEXA): At age 50, ask your care team if you should have this scan for osteoporosis (brittle bones).  Hepatitis C: Get tested at least once in your life.  Abdominal aortic aneurysm screening: Talk to your doctor about having this screening if you:  Have ever smoked; and  Are biologically male; and  Are between the ages of 65 and 75.  STIs (sexually transmitted infections)  Before age 24: Ask your care team if you should be screened for STIs.  After age 24: Get screened for STIs if you're at risk. You are at risk for STIs (including HIV) if:  You are sexually active with more than one person.  You don't use condoms every time.  You or a partner was diagnosed with a sexually transmitted infection.  If you are at risk for HIV, ask about PrEP medicine to prevent HIV.  Get tested for HIV at least once in your life, whether you are at risk for HIV or not.  Cancer screening tests  Cervical cancer screening: If you have a cervix, begin getting regular cervical cancer screening tests at age 21. Most people who have regular screenings with normal results can stop after age 65. Talk about this with your provider.  Breast cancer scan (mammogram): If you've ever had breasts, begin having regular mammograms starting at age 40. This is a scan to check for breast cancer.  Colon cancer screening: It is important to start screening for colon cancer at age 45.  Have a colonoscopy test every 10 years (or more often if you're at risk) Or, ask your provider about stool tests like a FIT test every year or Cologuard test every 3 years.  To learn more about your testing options, visit: www.[employer]/018787.pdf.  For help making a decision, visit: sami/yk57970.  Prostate cancer  screening test: If you have a prostate and are age 55 to 69, ask your provider if you would benefit from a yearly prostate cancer screening test.  Lung cancer screening: If you are a current or former smoker age 50 to 80, ask your care team if ongoing lung cancer screenings are right for you.  For informational purposes only. Not to replace the advice of your health care provider. Copyright   2023 [facility]. All rights reserved. Clinically reviewed by the [employer] [facility]. Harry's 898169 - REV 04/24.    Learning About Stress  What is stress?     Stress is your body's response to a hard situation. Your body can have a physical, emotional, or mental response. Stress is a fact of life for most people, and it affects everyone differently. What causes stress for you may not be stressful for someone else.  A lot of things can cause stress. You may feel stress when you go on a job interview, take a test, or run a race. This kind of short-term stress is normal and even useful. It can help you if you need to work hard or react quickly. For example, stress can help you finish an important job on time.  Long-term stress is caused by ongoing stressful situations or events. Examples of long-term stress include long-term health problems, ongoing problems at work, or conflicts in your family. Long-term stress can harm your health.  How does stress affect your health?  When you are stressed, your body responds as though you are in danger. It makes hormones that speed up your heart, make you breathe faster, and give you a burst of energy. This is called the fight-or-flight stress response. If the stress is over quickly, your body goes back to normal and no harm is done.  But if stress happens too often or lasts too long, it can have bad effects. Long-term stress can make you more likely to get sick, and it can make symptoms of some diseases worse. If you tense up when you are stressed, you may  develop neck, shoulder, or low back pain. Stress is linked to high blood pressure and heart disease.  Stress also harms your emotional health. It can make you montoya, tense, or depressed. Your relationships may suffer, and you may not do well at work or school.  What can you do to manage stress?  You can try these things to help manage stress:   Do something active. Exercise or activity can help reduce stress. Walking is a great way to get started. Even everyday activities such as housecleaning or yard work can help.  Try yoga or casey chi. These techniques combine exercise and meditation. You may need some training at first to learn them.  Do something you enjoy. For example, listen to music or go to a movie. Practice your hobby or do volunteer work.  Meditate. This can help you relax, because you are not worrying about what happened before or what may happen in the future.  Do guided imagery. Imagine yourself in any setting that helps you feel calm. You can use online videos, books, or a teacher to guide you.  Do breathing exercises. For example:  From a standing position, bend forward from the waist with your knees slightly bent. Let your arms dangle close to the floor.  Breathe in slowly and deeply as you return to a standing position. Roll up slowly and lift your head last.  Hold your breath for just a few seconds in the standing position.  Breathe out slowly and bend forward from the waist.  Let your feelings out. Talk, laugh, cry, and express anger when you need to. Talking with supportive friends or family, a counselor, or a yonis leader about your feelings is a healthy way to relieve stress. Avoid discussing your feelings with people who make you feel worse.  Write. It may help to write about things that are bothering you. This helps you find out how much stress you feel and what is causing it. When you know this, you can find better ways to cope.  What can you do to prevent stress?  You might try some of  "these things to help prevent stress:  Manage your time. This helps you find time to do the things you want and need to do.  Get enough sleep. Your body recovers from the stresses of the day while you are sleeping.  Get support. Your family, friends, and community can make a difference in how you experience stress.  Limit your news feed. Avoid or limit time on social media or news that may make you feel stressed.  Do something active. Exercise or activity can help reduce stress. Walking is a great way to get started.  Where can you learn more?  Go to https://www.[employer].net/patiented  Enter N032 in the search box to learn more about \"Learning About Stress.\"  Current as of: October 24, 2023               Content Version: 14.0    9111-1029 [employer].   Care instructions adapted under license by your healthcare professional. If you have questions about a medical condition or this instruction, always ask your healthcare professional. [employer] disclaims any warranty or liability for your use of this information.      "

## 2024-05-14 NOTE — PROGRESS NOTES
Preventive Care Visit  [facility] SHAHLA Juan Darrick Llamas PA-C, Family Medicine  May 14, 2024      Assessment & Plan     Routine general medical examination at a health care facility  Reviewed personal and family history. Reviewed age appropriate screenings. Recommended any needed vaccinations. He'll look into some past records to update these  - Lipid panel reflex to direct LDL Non-fasting; Future  - Basic metabolic panel  (Ca, Cl, CO2, Creat, Gluc, K, Na, BUN); Future        Counseling  Appropriate preventive services were discussed with this patient, including applicable screening as appropriate for fall prevention, nutrition, physical activity, Tobacco-use cessation, weight loss and cognition.  Checklist reviewing preventive services available has been given to the patient.  Reviewed patient's diet, addressing concerns and/or questions.   He is at risk for psychosocial distress and has been provided with information to reduce risk.           Cachorro Juan is a 38 year old, presenting for the following:  Physical        5/14/2024     3:47 PM   Additional Questions   Roomed by Lisa ALEXANDER MA   Accompanied by Self         5/14/2024     3:47 PM   Patient Reported Additional Medications   Patient reports taking the following new medications None        Health Care Directive  Patient does not have a Health Care Directive or Living Will: Discussed advance care planning with patient; however, patient declined at this time.    HPI        5/14/2024   General Health   How would you rate your overall physical health? Good   Feel stress (tense, anxious, or unable to sleep) Only a little   (!) STRESS CONCERN      5/14/2024   Nutrition   Three or more servings of calcium each day? Yes   Diet: Regular (no restrictions)   How many servings of fruit and vegetables per day? (!) 0-1   How many sweetened beverages each day? 0-1         5/14/2024   Exercise   Days per week of moderate/strenous exercise 5  "days   Average minutes spent exercising at this level 30 min         5/14/2024   Social Factors   Frequency of gathering with friends or relatives Once a week   Worry food won't last until get money to buy more No   Food not last or not have enough money for food? No   Do you have housing?  Yes   Are you worried about losing your housing? No   Lack of transportation? No   Unable to get utilities (heat,electricity)? No         5/14/2024   Dental   Dentist two times every year? Yes         5/14/2024   TB Screening   Were you born outside of the [place]? No         Today's PHQ-2 Score:       5/14/2024     3:15 PM   PHQ-2 ( 1999 [employer])   Q1: Little interest or pleasure in doing things 0   Q2: Feeling down, depressed or hopeless 0   PHQ-2 Score 0   Q1: Little interest or pleasure in doing things Not at all   Q2: Feeling down, depressed or hopeless Not at all   PHQ-2 Score 0           5/14/2024   Substance Use   Alcohol more than 3/day or more than 7/wk No   Do you use any other substances recreationally? (!) ALCOHOL     Social History     Tobacco Use    Smoking status: Never    Smokeless tobacco: Never   Vaping Use    Vaping status: Never Used   Substance Use Topics    Alcohol use: Yes     Comment: Wine at dinner and a few light beers a month    Drug use: No           5/14/2024   STI Screening   New sexual partner(s) since last STI/HIV test? No         5/14/2024   Contraception/Family Planning   Questions about contraception or family planning No        Reviewed and updated as needed this visit by Provider   Tobacco       Fam Hx            Lab work is in process  Labs reviewed in EPIC      Review of Systems  Constitutional, HEENT, cardiovascular, pulmonary, gi and gu systems are negative, except as otherwise noted.     Objective    Exam  /77 (BP Location: Right arm, Patient Position: Sitting, Cuff Size: Adult Regular)   Pulse 69   Temp 97.4  F (36.3  C) (Oral)   Resp 24   Ht 1.836 m (6' 0.28\")   Wt 85 kg (187 lb " "6.4 oz)   SpO2 96%   BMI 25.22 kg/m     Estimated body mass index is 25.22 kg/m  as calculated from the following:    Height as of this encounter: 1.836 m (6' 0.28\").    Weight as of this encounter: 85 kg (187 lb 6.4 oz).    Physical Exam  GENERAL: alert and no distress  EYES: Eyes grossly normal to inspection, PERRL and conjunctivae and sclerae normal  HENT: ear canals and TM's normal, nose and mouth without ulcers or lesions  NECK: no adenopathy, no asymmetry, masses, or scars  RESP: lungs clear to auscultation - no rales, rhonchi or wheezes  CV: regular rate and rhythm, normal S1 S2, no S3 or S4, no murmur, click or rub, no peripheral edema  ABDOMEN: soft, nontender, no hepatosplenomegaly, no masses and bowel sounds normal  MS: no gross musculoskeletal defects noted, no edema  PSYCH: mentation appears normal, affect normal/bright        Signed Electronically by: Drake Llamas PA-C    "

## 2024-05-20 ENCOUNTER — TELEPHONE (OUTPATIENT)
Dept: FAMILY MEDICINE | Facility: CLINIC | Age: 39
End: 2024-05-20
Payer: COMMERCIAL

## 2024-05-20 ENCOUNTER — MYC MEDICAL ADVICE (OUTPATIENT)
Dept: FAMILY MEDICINE | Facility: CLINIC | Age: 39
End: 2024-05-20
Payer: COMMERCIAL

## 2024-05-20 DIAGNOSIS — L29.0 ANAL PRURITUS: Primary | ICD-10-CM

## 2024-05-20 NOTE — TELEPHONE ENCOUNTER
At his appt I though he mentioned there was improvement. If not I am happy to send anahy to colorectal specialty if he'd like

## 2024-05-20 NOTE — TELEPHONE ENCOUNTER
"Received call from patient. Patient was seen for anal pruritus 4/15/24 and for physical 5/14/24. Patient continuing to experiencing anal pruritus and asking about next possible steps.     Had small amount of blood when wiping yesterday. Patient continues to experience itching, only on the inner anus area now. Has been ongoing for past ~6 weeks. Worse at night. Interrupting sleep. Creams seemed to work for a little bit, but patient feels the itching is getting worse. Patient reports a mild burning sensation when passing gas. \"Not painful, more of an irritation\". No fever. No other symptoms.     Patient would prefer mychart response back as his cell reception is bad at work.     Neno CAMPBELL RN 5/20/2024 at 7:18 AM    "

## 2024-06-19 ENCOUNTER — TRANSFERRED RECORDS (OUTPATIENT)
Dept: HEALTH INFORMATION MANAGEMENT | Facility: CLINIC | Age: 39
End: 2024-06-19
Payer: COMMERCIAL

## 2024-07-08 ENCOUNTER — TELEPHONE (OUTPATIENT)
Dept: FAMILY MEDICINE | Facility: CLINIC | Age: 39
End: 2024-07-08
Payer: COMMERCIAL

## 2024-07-08 NOTE — TELEPHONE ENCOUNTER
Reason for Call:  Other appointment    Detailed comments: PT CALLING FOR A FOLLOW UP WITH LYRIC FABIAN PER STRESS TEST FINDING HAS APPT 7/22/24 WANTS THIS WEEK SOME TIME PLEASE CALL AND ADVISE     Phone Number Patient can be reached at: Cell number on file:    Telephone Information:   Mobile [phone]       Best Time: ANYTIME    Can we leave a detailed message on this number? YES    Call taken on 7/8/2024 at 4:10 PM by Harshad Pittman

## 2024-07-08 NOTE — TELEPHONE ENCOUNTER
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 Pt states he has a right bundle branch block and inquires about the diagnosis, plan, safe for work etc. Pt denies any current symptoms including difficulty breathing and chest pain. Pt states he had the test done at [facility]. Advised pt to discuss this with provider at a visit. Advised bringing the documentation to appointment or sending via [employer]. Pt verbalized understanding and agrees with plan. Scheduled pt to be seen.  Dre Desai RN on 7/8/2024 at 4:32 PM

## 2024-07-09 NOTE — TELEPHONE ENCOUNTER
Pt has been scheduled w/ provider for a in person visit to discuss  STRESS TEST.       Keya Cintron     [facility] [place]

## 2024-07-11 ENCOUNTER — OFFICE VISIT (OUTPATIENT)
Dept: FAMILY MEDICINE | Facility: CLINIC | Age: 39
End: 2024-07-11
Payer: COMMERCIAL

## 2024-07-11 VITALS
DIASTOLIC BLOOD PRESSURE: 78 MMHG | HEART RATE: 60 BPM | BODY MASS INDEX: 25.29 KG/M2 | WEIGHT: 186.7 LBS | SYSTOLIC BLOOD PRESSURE: 120 MMHG | TEMPERATURE: 97.6 F | OXYGEN SATURATION: 100 % | HEIGHT: 72 IN | RESPIRATION RATE: 18 BRPM

## 2024-07-11 DIAGNOSIS — Z23 NEED FOR VACCINATION: ICD-10-CM

## 2024-07-11 DIAGNOSIS — I45.10 RBBB (RIGHT BUNDLE BRANCH BLOCK): Primary | ICD-10-CM

## 2024-07-11 PROCEDURE — 90471 IMMUNIZATION ADMIN: CPT | Performed by: PHYSICIAN ASSISTANT

## 2024-07-11 PROCEDURE — 90472 IMMUNIZATION ADMIN EACH ADD: CPT | Performed by: PHYSICIAN ASSISTANT

## 2024-07-11 PROCEDURE — 90715 TDAP VACCINE 7 YRS/> IM: CPT | Performed by: PHYSICIAN ASSISTANT

## 2024-07-11 PROCEDURE — 90746 HEPB VACCINE 3 DOSE ADULT IM: CPT | Performed by: PHYSICIAN ASSISTANT

## 2024-07-11 PROCEDURE — 99213 OFFICE O/P EST LOW 20 MIN: CPT | Mod: 25 | Performed by: PHYSICIAN ASSISTANT

## 2024-07-11 NOTE — PROGRESS NOTES
Assessment & Plan     RBBB (right bundle branch block)  Reviewed RBBB overall, risks and what to screen for. This is not new; reviewed his prior tests showing this as well. Updating below and will fax results when complete  - Echocardiogram Complete; Future    Need for vaccination  - TDAP 7+ (ADACEL,BOOSTRIX)  - HEPATITIS B VACCINE (20 YEARS AND OLDER) (ENGERIX-B/RECOMBIVAX)          Cachorro Juan is a 38 year old, presenting for the following health issues:  go over stress test      7/11/2024    10:39 AM   Additional Questions   Roomed by Irasema SMITH     History of Present Illness       Reason for visit:  Heart abnormality on stress test - RBBB  Symptoms include:  None  Had these symptoms before:  No  What makes it worse:  No  What makes it better:  No    He eats 0-1 servings of fruits and vegetables daily.He consumes 1 sweetened beverage(s) daily.He exercises with enough effort to increase his heart rate 10 to 19 minutes per day.  He exercises with enough effort to increase his heart rate 4 days per week.   He is taking medications regularly.     Drake Khalil is a 38 year old male who presents today for discussion regard RBBB  Noted on a recent screening  He denies any symptoms   There is a hx of this in our records    He will need all records sent to: [phone]      Review of Systems  Constitutional, HEENT, cardiovascular, pulmonary, gi and gu systems are negative, except as otherwise noted.      Objective    /78 (BP Location: Right arm, Patient Position: Sitting, Cuff Size: Adult Large)   Pulse 60   Temp 97.6  F (36.4  C) (Oral)   Resp 18   Ht 1.829 m (6')   Wt 84.7 kg (186 lb 11.2 oz)   SpO2 100%   BMI 25.32 kg/m    Body mass index is 25.32 kg/m .  Physical Exam   GENERAL: alert and no distress  RESP: lungs clear to auscultation - no rales, rhonchi or wheezes  CV: regular rate and rhythm, normal S1 S2, no S3 or S4, no murmur, click or rub, no peripheral edema  MS: No peripheral edema      Diagnostics: reviewed hx        Signed Electronically by: Drake Llamas PA-C

## 2024-07-16 ENCOUNTER — TRANSFERRED RECORDS (OUTPATIENT)
Dept: HEALTH INFORMATION MANAGEMENT | Facility: CLINIC | Age: 39
End: 2024-07-16
Payer: COMMERCIAL

## 2024-07-16 LAB — EJECTION FRACTION: 66 %

## 2024-07-22 ENCOUNTER — OFFICE VISIT (OUTPATIENT)
Dept: FAMILY MEDICINE | Facility: CLINIC | Age: 39
End: 2024-07-22
Payer: COMMERCIAL

## 2024-07-22 VITALS
RESPIRATION RATE: 26 BRPM | OXYGEN SATURATION: 97 % | WEIGHT: 186.1 LBS | TEMPERATURE: 97.6 F | BODY MASS INDEX: 25.21 KG/M2 | DIASTOLIC BLOOD PRESSURE: 78 MMHG | HEART RATE: 71 BPM | HEIGHT: 72 IN | SYSTOLIC BLOOD PRESSURE: 117 MMHG

## 2024-07-22 DIAGNOSIS — I45.10 RBBB (RIGHT BUNDLE BRANCH BLOCK): Primary | ICD-10-CM

## 2024-07-22 PROCEDURE — 99213 OFFICE O/P EST LOW 20 MIN: CPT | Performed by: PHYSICIAN ASSISTANT

## 2024-07-22 ASSESSMENT — PAIN SCALES - GENERAL: PAINLEVEL: NO PAIN (0)

## 2024-07-22 NOTE — PROGRESS NOTES
"  Assessment & Plan     RBBB (right bundle branch block)  Unfortunately we dont have Eduardo's ECHO results today but I am assuming since he was provided the job offer that it was normal; he'll fax us his echo results and ill review them and let him know. 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   He was not told the results but they were sent to [facility] and he was cleared for the job he applied for   -Notified that he was offered the job on Friday  He was hoping we would have the scan to review (we do not)  No other concerns today              Review of Systems  Constitutional, HEENT, cardiovascular, pulmonary, gi and gu systems are negative, except as otherwise noted.      Objective    /78 (BP Location: Right arm, Patient Position: Sitting, Cuff Size: Adult Regular)   Pulse 71   Temp 97.6  F (36.4  C) (Oral)   Resp 26   Ht 1.829 m (6' 0.01\")   Wt 84.4 kg (186 lb 1.6 oz)   SpO2 97%   BMI 25.23 kg/m    Body mass index is 25.23 kg/m .  Physical Exam   GENERAL: alert and no distress  RESP: lungs clear to auscultation - no rales, rhonchi or wheezes  CV: regular rate and rhythm, normal S1 S2, no S3 or S4, no murmur, click or rub, no peripheral edema  MS: No peripheral edema   PSYCH: mentation appears normal, affect normal/bright            Signed Electronically by: Drake Llamas PA-C    "

## 2025-04-14 ENCOUNTER — PATIENT OUTREACH (OUTPATIENT)
Dept: CARE COORDINATION | Facility: CLINIC | Age: 40
End: 2025-04-14
Payer: COMMERCIAL

## 2025-05-19 ENCOUNTER — PATIENT OUTREACH (OUTPATIENT)
Dept: CARE COORDINATION | Facility: CLINIC | Age: 40
End: 2025-05-19
Payer: COMMERCIAL

## 2025-05-20 ENCOUNTER — LAB (OUTPATIENT)
Dept: LAB | Facility: CLINIC | Age: 40
End: 2025-05-20
Payer: COMMERCIAL

## 2025-05-20 DIAGNOSIS — Z00.00 ROUTINE GENERAL MEDICAL EXAMINATION AT A HEALTH CARE FACILITY: ICD-10-CM

## 2025-05-20 LAB
ANION GAP SERPL CALCULATED.3IONS-SCNC: 7 MMOL/L (ref 7–15)
BUN SERPL-MCNC: 15.2 MG/DL (ref 6–20)
CALCIUM SERPL-MCNC: 9.7 MG/DL (ref 8.8–10.4)
CHLORIDE SERPL-SCNC: 103 MMOL/L (ref 98–107)
CHOLEST SERPL-MCNC: 186 MG/DL
CREAT SERPL-MCNC: 1.04 MG/DL (ref 0.67–1.17)
EGFRCR SERPLBLD CKD-EPI 2021: >90 ML/MIN/1.73M2
FASTING STATUS PATIENT QL REPORTED: YES
FASTING STATUS PATIENT QL REPORTED: YES
GLUCOSE SERPL-MCNC: 87 MG/DL (ref 70–99)
HCO3 SERPL-SCNC: 29 MMOL/L (ref 22–29)
HDLC SERPL-MCNC: 55 MG/DL
LDLC SERPL CALC-MCNC: 115 MG/DL
NONHDLC SERPL-MCNC: 131 MG/DL
POTASSIUM SERPL-SCNC: 4.8 MMOL/L (ref 3.4–5.3)
SODIUM SERPL-SCNC: 139 MMOL/L (ref 135–145)
TRIGL SERPL-MCNC: 80 MG/DL

## 2025-05-20 PROCEDURE — 80048 BASIC METABOLIC PNL TOTAL CA: CPT

## 2025-05-20 PROCEDURE — 36415 COLL VENOUS BLD VENIPUNCTURE: CPT

## 2025-05-20 PROCEDURE — 80061 LIPID PANEL: CPT

## 2025-05-21 ENCOUNTER — PATIENT OUTREACH (OUTPATIENT)
Dept: CARE COORDINATION | Facility: CLINIC | Age: 40
End: 2025-05-21
Payer: COMMERCIAL

## 2025-05-21 ENCOUNTER — RESULTS FOLLOW-UP (OUTPATIENT)
Dept: FAMILY MEDICINE | Facility: CLINIC | Age: 40
End: 2025-05-21

## 2025-08-04 ENCOUNTER — TELEPHONE (OUTPATIENT)
Dept: FAMILY MEDICINE | Facility: CLINIC | Age: 40
End: 2025-08-04
Payer: COMMERCIAL